# Patient Record
Sex: FEMALE | Race: OTHER | URBAN - METROPOLITAN AREA
[De-identification: names, ages, dates, MRNs, and addresses within clinical notes are randomized per-mention and may not be internally consistent; named-entity substitution may affect disease eponyms.]

---

## 2017-01-23 NOTE — PATIENT DISCUSSION
10 29 15 ^SRF RETX AND DECREASE F/U TO 6 WKS AND TO SEND LETTER TO INTERNIST REGARDING A TRAIL OF SPIRONOLACTONE

## 2017-01-23 NOTE — PATIENT DISCUSSION
Based on today's exam, diagnostic studies, and/or review of records, the determination was made for treatment today. LUCENTIS recommended TODAY AND REPEATT EVERY 6 WKS X 2 after discussion of benefits, risks and alternatives. The injection was given and tolerated well by the patient. Post-injection instructions were reviewed and understood by the patient. Procedure was performed without complications. Instructed to call immediately if any new distortion, blurring, decreased vision or eye pain.

## 2017-01-23 NOTE — PATIENT DISCUSSION
12 10 15 IMPRPOVED SIG DECREASE FLUID ON SPIRONOLACTONE FOR 2 WEEKS AND L TX TO  RETX AND KEEP F/U AT 6 WKS.   CONSIDER ^F/U IF SHE CONTINUES TO DO WELL

## 2017-01-23 NOTE — PROCEDURE NOTE: CLINICAL
PROCEDURE NOTE: Lucentis 0.5 mg OS. Diagnosis: Neovascular AMD with Active CNV. Anesthesia: Akten Gel 3.5%. Prep: Betadine Flush. Prior to injection, risks/benefits/alternatives discussed including infection, loss of vision, hemorrhage, cataract, glaucoma, retinal tears or detachment and patient wished to proceed. Topical anesthesia was induced with Alcaine. Additional anesthesia was achieved using drop(s) or injection checked above. A drop of Povidone-iodine 5% ophthalmic solution was instilled over the injection site and in the inferior fornix. Betadine prep was performed. A single use vial of intravitreal Lucentis 0.5mg/0.05ml was used and excess discarded. The needle was passed 3.0 mm posterior to the limbus in pseudophakic patients, and 3.5 mm posterior to the limbus in phakic patients. The remainder of the Lucentis 0.5mg in the single-use vial was then discarded in a medical waste disposal container. The eye was irrigated with sterile irrigating solution. Patient tolerated the procedure well. There were no complications. Post procedure instructions given. Injection Time 12:45 PM. CF vision checked. The patient was instructed to return for re-evaluation in approximately 4-12 weeks depending on his/her condition and was told to call immediately if vision decreases and/or if his/her eye becomes red, painful, and/or light sensitive. The patient was instructed to go to the emergency room or call 911 if unable to reach the doctor within an hour or two of trying or calling. The patient was instructed to use Artificial Tears q.i.d. p.r.n for comfort. Joseph Vega

## 2017-03-14 NOTE — PROCEDURE NOTE: CLINICAL
PROCEDURE NOTE: Lucentis 0.5 mg OS. Diagnosis: Neovascular AMD with Active CNV. Anesthesia: Akten Gel 3.5%. Prep: Betadine Flush. Prior to injection, risks/benefits/alternatives discussed including infection, loss of vision, hemorrhage, cataract, glaucoma, retinal tears or detachment and patient wished to proceed. Topical anesthesia was induced with Alcaine. Additional anesthesia was achieved using drop(s) or injection checked above. A drop of Povidone-iodine 5% ophthalmic solution was instilled over the injection site and in the inferior fornix. Betadine prep was performed. A single use vial of intravitreal Lucentis 0.5mg/0.05ml was used and excess discarded. The needle was passed 3.0 mm posterior to the limbus in pseudophakic patients, and 3.5 mm posterior to the limbus in phakic patients. The remainder of the Lucentis 0.5mg in the single-use vial was then discarded in a medical waste disposal container. The eye was irrigated with sterile irrigating solution. Patient tolerated the procedure well. There were no complications. Post procedure instructions given. Injection Time 3:10 P. CF vision checked. The patient was instructed to return for re-evaluation in approximately 4-12 weeks depending on his/her condition and was told to call immediately if vision decreases and/or if his/her eye becomes red, painful, and/or light sensitive. The patient was instructed to go to the emergency room or call 911 if unable to reach the doctor within an hour or two of trying or calling. The patient was instructed to use Artificial Tears q.i.d. p.r.n for comfort. Diana Mckeon

## 2017-05-02 NOTE — PATIENT DISCUSSION
10 29 15 ^SRF RETX AND DECREASE F/U TO 6 WKS AND TO SEND LETTER TO INTERNIST REGARDING A TRAIL OF SPIRONOLACTONE.

## 2017-05-02 NOTE — PROCEDURE NOTE: CLINICAL
PROCEDURE NOTE: Lucentis 0.5 mg OS. Diagnosis: Neovascular AMD with Active CNV. Anesthesia: Topical. Prep: Betadine Flush. Prior to injection, risks/benefits/alternatives discussed including infection, loss of vision, hemorrhage, cataract, glaucoma, retinal tears or detachment and patient wished to proceed. Topical anesthesia was induced with Alcaine. Additional anesthesia was achieved using drop(s) or injection checked above. A drop of Povidone-iodine 5% ophthalmic solution was instilled over the injection site and in the inferior fornix. Betadine prep was performed. A single use vial of intravitreal Lucentis 0.5mg/0.05ml was used and excess discarded. The needle was passed 3.0 mm posterior to the limbus in pseudophakic patients, and 3.5 mm posterior to the limbus in phakic patients. The remainder of the Lucentis 0.5mg in the single-use vial was then discarded in a medical waste disposal container. The eye was irrigated with sterile irrigating solution. Patient tolerated the procedure well. There were no complications. Post procedure instructions given. Injection Time 4:31pm. CF vision checked. The patient was instructed to return for re-evaluation in approximately 4-12 weeks depending on his/her condition and was told to call immediately if vision decreases and/or if his/her eye becomes red, painful, and/or light sensitive. The patient was instructed to go to the emergency room or call 911 if unable to reach the doctor within an hour or two of trying or calling. The patient was instructed to use Artificial Tears q.i.d. p.r.n for comfort. Ami Boykin

## 2017-05-02 NOTE — PATIENT DISCUSSION
12 10 15 IMPRPOVED SIG DECREASE FLUID ON SPIRONOLACTONE FOR 2 WEEKS AND L TX TO  RETX AND KEEP F/U AT 6 WKS. CONSIDER ^F/U IF SHE CONTINUES TO DO WELL.

## 2017-07-11 NOTE — PROCEDURE NOTE: CLINICAL

## 2017-09-05 NOTE — PROCEDURE NOTE: CLINICAL
PROCEDURE NOTE: Lucentis 0.5mg PFS OS. Diagnosis: Neovascular AMD with Active CNV. Anesthesia: Akten Gel 3.5%. Prep: Betadine Flush. Prior to injection, risks/benefits/alternatives discussed including but not limited to infection, loss of vision or eye, hemorrhage, cataract, glaucoma, retinal tears or detachment. The patient wished to proceed with treatment. Topical anesthesia was induced with Alcaine. Additional anesthesia was achieved using drop(s) or injection checked above. A drop of Povidone-iodine 5% ophthalmic solution was instilled over the injection site and in the inferior fornix. Betadine prep was performed. A single use prefilled syringe of intravitreal Lucentis 0.5mg/0.05ml was used and excess discarded. The needle was passed 3.0 mm posterior to the limbus in pseudophakic patients, and 3.5 mm posterior to the limbus in phakic patients. The remainder of the Lucentis 0.5mg in the single-use vial was then discarded in a medical waste disposal container. The eye was irrigated with sterile irrigating solution. Patient tolerated the procedure well. There were no complications. Post procedure instructions given. CF vision checked. Injection Time 3:16pm. The patient was instructed to return for re-evaluation in approximately 4-12 weeks depending on his/her condition and was told to call immediately if vision decreases and/or if his/her eye becomes red, painful, and/or light sensitive. The patient was instructed to go to the emergency room or call 911 if unable to reach the doctor within an hour or two of trying or calling. Pa Malik

## 2017-10-31 NOTE — PATIENT DISCUSSION
LUCENTIS AND REPEAT EVERY 6 WKS X 2 - MOD SRF AT 8 WKS - SIG INCREASE OVER 5 2 17 TRACE EDEMA AT 7 WKS - CONTINUE SPIRONOLACTONE.

## 2017-10-31 NOTE — PROCEDURE NOTE: CLINICAL

## 2017-12-12 NOTE — PROCEDURE NOTE: CLINICAL
PROCEDURE NOTE: Lucentis 0.5mg PFS OS. Diagnosis: Neovascular AMD with Active CNV. Anesthesia: Topical. Prep: Betadine Flush. Prior to injection, risks/benefits/alternatives discussed including but not limited to infection, loss of vision or eye, hemorrhage, cataract, glaucoma, retinal tears or detachment. The patient wished to proceed with treatment. Topical anesthesia was induced with Alcaine. Additional anesthesia was achieved using drop(s) or injection checked above. A drop of Povidone-iodine 5% ophthalmic solution was instilled over the injection site and in the inferior fornix. Betadine prep was performed. A single use prefilled syringe of intravitreal Lucentis 0.5mg/0.05ml was used and excess discarded. The needle was passed 3.0 mm posterior to the limbus in pseudophakic patients, and 3.5 mm posterior to the limbus in phakic patients. The remainder of the Lucentis 0.5mg in the single-use vial was then discarded in a medical waste disposal container. The eye was irrigated with sterile irrigating solution. Patient tolerated the procedure well. There were no complications. Post procedure instructions given. CF vision checked. Injection Time 1:309 PM. The patient was instructed to return for re-evaluation in approximately 4-12 weeks depending on his/her condition and was told to call immediately if vision decreases and/or if his/her eye becomes red, painful, and/or light sensitive. The patient was instructed to go to the emergency room or call 911 if unable to reach the doctor within an hour or two of trying or calling. Meme Doll

## 2018-01-30 NOTE — PROCEDURE NOTE: CLINICAL
PROCEDURE NOTE: Lucentis 0.5mg PFS OS. Diagnosis: Neovascular AMD with Active CNV. Anesthesia: Akten Gel 3.5%. Prep: Betadine Flush. Prior to injection, risks/benefits/alternatives discussed including but not limited to infection, loss of vision or eye, hemorrhage, cataract, glaucoma, retinal tears or detachment. The patient wished to proceed with treatment. Topical anesthesia was induced with Alcaine. Additional anesthesia was achieved using drop(s) or injection checked above. A drop of Povidone-iodine 5% ophthalmic solution was instilled over the injection site and in the inferior fornix. Betadine prep was performed. A single use prefilled syringe of intravitreal Lucentis 0.5mg/0.05ml was used and excess discarded. The needle was passed 3.0 mm posterior to the limbus in pseudophakic patients, and 3.5 mm posterior to the limbus in phakic patients. The remainder of the Lucentis 0.5mg in the single-use vial was then discarded in a medical waste disposal container. The eye was irrigated with sterile irrigating solution. Patient tolerated the procedure well. There were no complications. Post procedure instructions given. CF vision checked. Injection Time 4:45 PM. The patient was instructed to return for re-evaluation in approximately 4-12 weeks depending on his/her condition and was told to call immediately if vision decreases and/or if his/her eye becomes red, painful, and/or light sensitive. The patient was instructed to go to the emergency room or call 911 if unable to reach the doctor within an hour or two of trying or calling. Mauro Dunn

## 2018-03-26 NOTE — PROCEDURE NOTE: CLINICAL
PROCEDURE NOTE: Lucentis 0.5mg PFS OS. Diagnosis: Neovascular AMD with Active CNV. Anesthesia: Topical. Prep: Betadine Flush. Prior to injection, risks/benefits/alternatives discussed including but not limited to infection, loss of vision or eye, hemorrhage, cataract, glaucoma, retinal tears or detachment. The patient wished to proceed with treatment. Topical anesthesia was induced with Alcaine. Additional anesthesia was achieved using drop(s) or injection checked above. A drop of Povidone-iodine 5% ophthalmic solution was instilled over the injection site and in the inferior fornix. Betadine prep was performed. A single use prefilled syringe of intravitreal Lucentis 0.5mg/0.05ml was used and excess discarded. The needle was passed 3.0 mm posterior to the limbus in pseudophakic patients, and 3.5 mm posterior to the limbus in phakic patients. The remainder of the Lucentis 0.5mg in the single-use vial was then discarded in a medical waste disposal container. The eye was irrigated with sterile irrigating solution. Patient tolerated the procedure well. There were no complications. Post procedure instructions given. CF vision checked. Injection Time 1:15. The patient was instructed to return for re-evaluation in approximately 4-12 weeks depending on his/her condition and was told to call immediately if vision decreases and/or if his/her eye becomes red, painful, and/or light sensitive. The patient was instructed to go to the emergency room or call 911 if unable to reach the doctor within an hour or two of trying or calling. Nayeli Gutierrez

## 2018-03-26 NOTE — PATIENT DISCUSSION
LUCENTIS AND REPEAT EVERY 8 WKS X 2 - TRACE SRF AT 8 WKS - SIG IMPROVEMENT OVER 10 31 17 AT 8 WKS  - TO KEEP 8 WK F/U -- PT HAS BEEN OFF SPIRONOLACTONE FOR 1 MONTH ON 3 26 18 - TO CONTINUE TO SEE HOW SHE DOES OF SPIROLOLATONE.

## 2018-05-21 NOTE — PROCEDURE NOTE: CLINICAL
PROCEDURE NOTE: Lucentis 0.5mg PFS OS. Diagnosis: Neovascular AMD with Active CNV. Anesthesia: Topical. Prep: Betadine Flush. Prior to injection, risks/benefits/alternatives discussed including but not limited to infection, loss of vision or eye, hemorrhage, cataract, glaucoma, retinal tears or detachment. The patient wished to proceed with treatment. Topical anesthesia was induced with Alcaine. Additional anesthesia was achieved using drop(s) or injection checked above. A drop of Povidone-iodine 5% ophthalmic solution was instilled over the injection site and in the inferior fornix. Betadine prep was performed. A single use prefilled syringe of intravitreal Lucentis 0.5mg/0.05ml was used and excess discarded. The needle was passed 3.0 mm posterior to the limbus in pseudophakic patients, and 3.5 mm posterior to the limbus in phakic patients. The remainder of the Lucentis 0.5mg in the single-use vial was then discarded in a medical waste disposal container. The eye was irrigated with sterile irrigating solution. Patient tolerated the procedure well. There were no complications. Post procedure instructions given. CF vision checked. Injection Time *. The patient was instructed to return for re-evaluation in approximately 4-12 weeks depending on his/her condition and was told to call immediately if vision decreases and/or if his/her eye becomes red, painful, and/or light sensitive. The patient was instructed to go to the emergency room or call 911 if unable to reach the doctor within an hour or two of trying or calling. Mauro Dunn

## 2018-07-09 NOTE — PROCEDURE NOTE: CLINICAL
PROCEDURE NOTE: Lucentis 0.5mg PFS OS. Diagnosis: Neovascular AMD with Active CNV. Anesthesia: Topical. Prep: Betadine Flush. Prior to injection, risks/benefits/alternatives discussed including but not limited to infection, loss of vision or eye, hemorrhage, cataract, glaucoma, retinal tears or detachment. The patient wished to proceed with treatment. Topical anesthesia was induced with Alcaine. Additional anesthesia was achieved using drop(s) or injection checked above. A drop of Povidone-iodine 5% ophthalmic solution was instilled over the injection site and in the inferior fornix. Betadine prep was performed. A single use prefilled syringe of intravitreal Lucentis 0.5mg/0.05ml was used and excess discarded. The needle was passed 3.0 mm posterior to the limbus in pseudophakic patients, and 3.5 mm posterior to the limbus in phakic patients. The remainder of the Lucentis 0.5mg in the single-use vial was then discarded in a medical waste disposal container. The eye was irrigated with sterile irrigating solution. Patient tolerated the procedure well. There were no complications. Post procedure instructions given. CF vision checked. Injection Time *. The patient was instructed to return for re-evaluation in approximately 4-12 weeks depending on his/her condition and was told to call immediately if vision decreases and/or if his/her eye becomes red, painful, and/or light sensitive. The patient was instructed to go to the emergency room or call 911 if unable to reach the doctor within an hour or two of trying or calling. Kathy Sewell

## 2018-08-20 NOTE — PROCEDURE NOTE: CLINICAL
PROCEDURE NOTE: Lucentis 0.5mg PFS OS. Diagnosis: Neovascular AMD with Active CNV. Anesthesia: Topical. Prep: Betadine Flush. Prior to injection, risks/benefits/alternatives discussed including but not limited to infection, loss of vision or eye, hemorrhage, cataract, glaucoma, retinal tears or detachment. The patient wished to proceed with treatment. Topical anesthesia was induced with Alcaine. Additional anesthesia was achieved using drop(s) or injection checked above. A drop of Povidone-iodine 5% ophthalmic solution was instilled over the injection site and in the inferior fornix. Betadine prep was performed. A single use prefilled syringe of intravitreal Lucentis 0.5mg/0.05ml was used and excess discarded. The needle was passed 3.0 mm posterior to the limbus in pseudophakic patients, and 3.5 mm posterior to the limbus in phakic patients. The remainder of the Lucentis 0.5mg in the single-use vial was then discarded in a medical waste disposal container. The eye was irrigated with sterile irrigating solution. Patient tolerated the procedure well. There were no complications. Post procedure instructions given. CF vision checked. Injection Time 12:58PM. The patient was instructed to return for re-evaluation in approximately 4-12 weeks depending on his/her condition and was told to call immediately if vision decreases and/or if his/her eye becomes red, painful, and/or light sensitive. The patient was instructed to go to the emergency room or call 911 if unable to reach the doctor within an hour or two of trying or calling. Meme Doll

## 2018-08-20 NOTE — PATIENT DISCUSSION
LUCENTIS AND REPEAT EVERY 7 WKS X 2 - TRACE EDEMA AT 6 WKS - WAS DOING WELL AT 7 WKS - TO CONTINUE WIHT 7 WKS.

## 2018-08-20 NOTE — PATIENT DISCUSSION
- requesting sleep agent, Melatonin ordered.  - MRI/MRA reviewed with attending MD. Neuro on consult.   - Updated RN on POC.    Visit Vitals  /65 (BP Location: RUE, Patient Position: Left side lying)   Pulse 85   Temp 97.2 °F (36.2 °C) (Temporal Artery)   Resp 20   Ht 5' 1\" (1.549 m)   Wt 45.1 kg   SpO2 95%   BMI 18.79 kg/m²     Ana Casanova NP  [facility] [place] [facility]  [phone]   7 20 16 DOING BETTER 6 WKS, RETX AND REPEAT EVERY 6 WKS X 2, CONT SPIRONOLACTONE AND F/U INTERNIST.

## 2022-02-21 ENCOUNTER — NEW PATIENT (OUTPATIENT)
Dept: URBAN - METROPOLITAN AREA CLINIC 49 | Facility: CLINIC | Age: 87
End: 2022-02-21

## 2022-02-21 DIAGNOSIS — H43.813: ICD-10-CM

## 2022-02-21 DIAGNOSIS — H35.362: ICD-10-CM

## 2022-02-21 PROCEDURE — 92004 COMPRE OPH EXAM NEW PT 1/>: CPT

## 2022-02-21 PROCEDURE — 92134 CPTRZ OPH DX IMG PST SGM RTA: CPT

## 2022-02-21 ASSESSMENT — KERATOMETRY
OS_AXISANGLE_DEGREES: 145
OD_AXISANGLE_DEGREES: 148
OS_K2POWER_DIOPTERS: 43.00
OS_AXISANGLE2_DEGREES: 55
OS_K1POWER_DIOPTERS: 42.75
OD_K2POWER_DIOPTERS: 42.25
OD_AXISANGLE2_DEGREES: 58
OD_K1POWER_DIOPTERS: 42.00

## 2022-02-21 ASSESSMENT — VISUAL ACUITY
OU_SC: J1
OD_GLARE: 20/50
OS_GLARE: 20/40
OS_SC: 20/200
OD_SC: 20/30-1
OS_PH: 20/70
OS_GLARE: 20/70
OD_GLARE: 20/30

## 2022-02-21 ASSESSMENT — TONOMETRY
OS_IOP_MMHG: 17
OD_IOP_MMHG: 14

## 2022-05-29 NOTE — PATIENT DISCUSSION
Pt presents to the ED with c/o left sided chest pain that radiates to the middle of her chest and her back x several weeks. Reports the pain being worse tonight and woke her up. Denies N/V/D. Reports mild SOB. Denies cough. Reports having the same pain a year ago but did not go to the doctor for it. Reports the pain being worse this time. Reports smoking marijuana and drinking a glass of wine tonight. Recommended observation.

## 2022-12-05 NOTE — PATIENT DISCUSSION
12 10 15 IMPRPOVED SIG DECREASE FLUID ON SPIRONOLACTONE FOR 2 WEEKS AND L TX TO  RETX AND KEEP F/U AT 6 WKS. CONSIDER ^F/U IF SHE CONTINUES TO DO WELL. Patient Call: General  Route to LPN    Reason for call: Tested positive for COVID yesterday  Went to ER- they told him to get scheduled for the infusion     Call back needed? Yes    Return Call Needed  Same as documented in contacts section  When to return call?: Same day: Route High Priority

## 2024-07-31 NOTE — PATIENT DISCUSSION
11 29 16 SUSP BUT NO DEF CNV - FOLLOW Pt to the car per wheelchair accompanied by staff. Pt home with family

## 2025-02-11 NOTE — PATIENT DISCUSSION
12 10 15 IMPRPOVED SIG DECREASE FLUID ON SPIRONOLACTONE FOR 2 WEEKS AND L TX TO  RETX AND KEEP F/U AT 6 WKS. CONSIDER ^F/U IF SHE CONTINUES TO DO WELL. English